# Patient Record
Sex: FEMALE | URBAN - METROPOLITAN AREA
[De-identification: names, ages, dates, MRNs, and addresses within clinical notes are randomized per-mention and may not be internally consistent; named-entity substitution may affect disease eponyms.]

---

## 2020-11-30 ENCOUNTER — VIRTUAL VISIT (OUTPATIENT)
Dept: FAMILY MEDICINE CLINIC | Age: 50
End: 2020-11-30
Payer: COMMERCIAL

## 2020-11-30 DIAGNOSIS — E66.9 OBESITY (BMI 30-39.9): ICD-10-CM

## 2020-11-30 DIAGNOSIS — R73.03 PREDIABETES: Primary | ICD-10-CM

## 2020-11-30 PROCEDURE — 99202 OFFICE O/P NEW SF 15 MIN: CPT | Performed by: LEGAL MEDICINE

## 2020-11-30 NOTE — PROGRESS NOTES
Marcin Lee is a 53 y.o. female (: 1970) presenting to address:    Chief Complaint   Patient presents with    New Patient     abnormal A1C per GYN       There were no vitals filed for this visit. Hearing/Vision:   No exam data present    Learning Assessment:     Learning Assessment 2020   PRIMARY LEARNER Patient   HIGHEST LEVEL OF EDUCATION - PRIMARY LEARNER  2 YEARS OF COLLEGE   PRIMARY LANGUAGE ENGLISH   LEARNER PREFERENCE PRIMARY LISTENING   ANSWERED BY patient   RELATIONSHIP SELF     Depression Screening:     3 most recent PHQ Screens 2020   Little interest or pleasure in doing things Not at all   Feeling down, depressed, irritable, or hopeless Not at all   Total Score PHQ 2 0     Fall Risk Assessment:     Fall Risk Assessment, last 12 mths 2020   Able to walk? Yes   Fall in past 12 months? No     Abuse Screening:     Abuse Screening Questionnaire 2020   Do you ever feel afraid of your partner? N   Are you in a relationship with someone who physically or mentally threatens you? N   Is it safe for you to go home? Y     Coordination of Care Questionaire:   1. Have you been to the ER, urgent care clinic since your last visit? Hospitalized since your last visit? NO    2. Have you seen or consulted any other health care providers outside of the [address] [place] [address] since your last visit? Include any pap smears or colon screening. YES, GYN [facility]    Advanced Directive:   1. Do you have an Advanced Directive? NO    2. Would you like information on Advanced Directives?  NO

## 2020-11-30 NOTE — PATIENT INSTRUCTIONS
Lifestyle modification has been discussed with patient in details, decrease carbohydrates, decrease or eliminate sugar intake, gradually increase physical activity as tolerated to be about 4 to 5 hours a week.

## 2020-11-30 NOTE — PROGRESS NOTES
Marley Keller is a 53 y.o. female who was seen by synchronous (real-time) audio-video technology on 11/30/2020 for New Patient (abnormal A1C per GYN)    She is here to establish care was seen at [address] [place] [address] zara Hunt Born       Had annual exam HB A1c was /?6.0   one month ago   On 10/23  patient has already started with lifestyle modifications decrease carbohydrates and sugar and increasing physical activity    She preferred not to take any medication at this time and she will try with lifestyle modification    Record will be requested from OB/GYN for complete blood work  Patient had declined our dietitian referral    Weight 206 lb  Bacilio 5 8  BMI 30     Assessment & Plan:   Diagnoses and all orders for this visit:    1. Prediabetes    2. Obesity (BMI 30-39.9)          712  Subjective:       Prior to Admission medications    Not on File     There is no problem list on file for this patient. There are no active problems to display for this patient. No Known Allergies  History reviewed. No pertinent past medical history. History reviewed. No pertinent surgical history. Family History   Problem Relation Age of Onset    Alzheimer Mother     Cancer Maternal Grandmother      Social History     Tobacco Use    Smoking status: Never Smoker    Smokeless tobacco: Never Used   Substance Use Topics    Alcohol use: Not Currently       Review of Systems   Constitutional: Negative for chills, fever, malaise/fatigue and weight loss. HENT: Negative for congestion, ear discharge, ear pain, hearing loss, nosebleeds, sinus pain and sore throat. Eyes: Negative for blurred vision, double vision and discharge. Respiratory: Negative for cough. Cardiovascular: Negative for chest pain, palpitations, claudication and leg swelling. Gastrointestinal: Negative for abdominal pain, constipation, diarrhea, nausea and vomiting.    Genitourinary: Negative for dysuria, frequency and urgency. Musculoskeletal: Negative for myalgias. Skin: Negative for itching and rash. Neurological: Negative for dizziness, tingling, sensory change, speech change, focal weakness, weakness and headaches. Psychiatric/Behavioral: Negative for depression and suicidal ideas. Objective:     Patient-Reported Vitals 11/30/2020   Patient-Reported Weight 206lbs   Patient-Reported LMP In August      General: alert, cooperative, no distress   Mental  status: normal mood, behavior, speech, dress, motor activity, and thought processes, able to follow commands   HENT: NCAT   Neck: no visualized mass   Resp: no respiratory distress   Neuro: no gross deficits   Skin: no discoloration or lesions of concern on visible areas   Psychiatric: normal affect, consistent with stated mood, no evidence of hallucinations     Additional exam findings: We discussed the expected course, resolution and complications of the diagnosis(es) in detail. Medication risks, benefits, costs, interactions, and alternatives were discussed as indicated. I advised her to contact the office if her condition worsens, changes or fails to improve as anticipated. She expressed understanding with the diagnosis(es) and plan. Jasbibijefferymerlene Jeff, who was evaluated through a patient-initiated, synchronous (real-time) audio-video encounter, and/or her healthcare decision maker, is aware that it is a billable service, with coverage as determined by her insurance carrier. She provided verbal consent to proceed: Yes, and patient identification was verified. It was conducted pursuant to the emergency declaration under the [facility], [address] [place] [address] authority and the [employer] Act. A caregiver was present when appropriate. Ability to conduct physical exam was limited. I was in the office. The patient was at home.       Chuy Jett MD

## 2021-01-20 ENCOUNTER — TELEPHONE (OUTPATIENT)
Dept: FAMILY MEDICINE CLINIC | Age: 51
End: 2021-01-20

## 2021-01-20 NOTE — TELEPHONE ENCOUNTER
There are no labs received on this pt. Called her gyn [phone]. They will fax records today for the dr's review.

## 2021-01-20 NOTE — TELEPHONE ENCOUNTER
----- Message from Len Valdez sent at 1/20/2021 12:19 PM EST -----  Regarding: labs  Pls call to sched labs . Librado Mackay .. patient is hoping to have them scheduled on 2/1/2021

## 2021-01-20 NOTE — TELEPHONE ENCOUNTER
Received message from answering service. I do not see any orders in the system. Please review her chart and see if she needs labs done.

## 2021-03-01 ENCOUNTER — TELEPHONE (OUTPATIENT)
Dept: FAMILY MEDICINE CLINIC | Age: 51
End: 2021-03-01

## 2021-03-01 DIAGNOSIS — R73.03 PREDIABETES: Primary | ICD-10-CM

## 2021-03-01 DIAGNOSIS — Z00.00 ANNUAL PHYSICAL EXAM: ICD-10-CM

## 2021-03-01 NOTE — TELEPHONE ENCOUNTER
Patient has scheduled a lab appt for next week that she needs orders placed for. She dropped off the records from her OBGYN. She especially wants her A1C checked because it was high at her last vist. Please order that as well as any other labs she might need. I have scanned in her papers as well as put a copy in Dr Reddy Salvage box.

## 2021-03-04 NOTE — TELEPHONE ENCOUNTER
HB A1c ordered  , is she coming to labs for annual physical ?    HB a1c was only blood test with GYN ,

## 2021-03-08 ENCOUNTER — APPOINTMENT (OUTPATIENT)
Dept: FAMILY MEDICINE CLINIC | Age: 51
End: 2021-03-08

## 2021-03-08 DIAGNOSIS — R73.03 PREDIABETES: ICD-10-CM

## 2021-03-09 LAB — HBA1C MFR BLD: 6.1 % (ref 4.8–5.6)

## 2021-03-15 NOTE — PROGRESS NOTES
Hemoglobin A1c 6.1 patient need to be adherent to lifestyle modification decrease carbohydrate and sugar intake, she also can have the option of starting on Metformin 500 mg once or twice daily, has not patient had any recent annual physical with kidney and liver function test done?   That would be needed before we start Metformin

## 2021-03-15 NOTE — PROGRESS NOTES
Hemoglobin A1c is 6.1 the range of prediabetes, strongly advised patient patient decrease carbohydrate and sugar intake and increase exercise, this to prevent her from converting to diabetes, she can also be started on Metformin 500 mg once or twice daily

## 2021-03-30 ENCOUNTER — APPOINTMENT (OUTPATIENT)
Dept: FAMILY MEDICINE CLINIC | Age: 51
End: 2021-03-30

## 2021-03-30 DIAGNOSIS — Z00.00 ANNUAL PHYSICAL EXAM: ICD-10-CM

## 2021-03-31 LAB
ALBUMIN SERPL-MCNC: 3.9 G/DL (ref 3.8–4.8)
ALBUMIN/GLOB SERPL: 1.3 {RATIO} (ref 1.2–2.2)
ALP SERPL-CCNC: 104 IU/L (ref 39–117)
ALT SERPL-CCNC: 16 IU/L (ref 0–32)
AST SERPL-CCNC: 31 IU/L (ref 0–40)
BASOPHILS # BLD AUTO: 0 X10E3/UL (ref 0–0.2)
BASOPHILS NFR BLD AUTO: 1 %
BILIRUB SERPL-MCNC: 0.3 MG/DL (ref 0–1.2)
BUN SERPL-MCNC: 11 MG/DL (ref 6–24)
BUN/CREAT SERPL: 12 (ref 9–23)
CALCIUM SERPL-MCNC: 9 MG/DL (ref 8.7–10.2)
CHLORIDE SERPL-SCNC: 104 MMOL/L (ref 96–106)
CHOLEST SERPL-MCNC: 214 MG/DL (ref 100–199)
CO2 SERPL-SCNC: 22 MMOL/L (ref 20–29)
CREAT SERPL-MCNC: 0.92 MG/DL (ref 0.57–1)
EOSINOPHIL # BLD AUTO: 0.2 X10E3/UL (ref 0–0.4)
EOSINOPHIL NFR BLD AUTO: 4 %
ERYTHROCYTE [DISTWIDTH] IN BLOOD BY AUTOMATED COUNT: 12.5 % (ref 11.7–15.4)
GLOBULIN SER CALC-MCNC: 3.1 G/DL (ref 1.5–4.5)
GLUCOSE SERPL-MCNC: 106 MG/DL (ref 65–99)
HCT VFR BLD AUTO: 37.5 % (ref 34–46.6)
HDLC SERPL-MCNC: 51 MG/DL
HGB BLD-MCNC: 11.8 G/DL (ref 11.1–15.9)
IMM GRANULOCYTES # BLD AUTO: 0 X10E3/UL (ref 0–0.1)
IMM GRANULOCYTES NFR BLD AUTO: 0 %
IMP & REVIEW OF LAB RESULTS: NORMAL
LDLC SERPL CALC-MCNC: 143 MG/DL (ref 0–99)
LYMPHOCYTES # BLD AUTO: 1.8 X10E3/UL (ref 0.7–3.1)
LYMPHOCYTES NFR BLD AUTO: 41 %
MCH RBC QN AUTO: 27.1 PG (ref 26.6–33)
MCHC RBC AUTO-ENTMCNC: 31.5 G/DL (ref 31.5–35.7)
MCV RBC AUTO: 86 FL (ref 79–97)
MONOCYTES # BLD AUTO: 0.3 X10E3/UL (ref 0.1–0.9)
MONOCYTES NFR BLD AUTO: 7 %
NEUTROPHILS # BLD AUTO: 2.1 X10E3/UL (ref 1.4–7)
NEUTROPHILS NFR BLD AUTO: 47 %
PLATELET # BLD AUTO: 329 X10E3/UL (ref 150–450)
POTASSIUM SERPL-SCNC: 4.3 MMOL/L (ref 3.5–5.2)
PROT SERPL-MCNC: 7 G/DL (ref 6–8.5)
RBC # BLD AUTO: 4.35 X10E6/UL (ref 3.77–5.28)
SODIUM SERPL-SCNC: 139 MMOL/L (ref 134–144)
TRIGL SERPL-MCNC: 110 MG/DL (ref 0–149)
TSH SERPL DL<=0.005 MIU/L-ACNC: 3.2 UIU/ML (ref 0.45–4.5)
VLDLC SERPL CALC-MCNC: 20 MG/DL (ref 5–40)
WBC # BLD AUTO: 4.4 X10E3/UL (ref 3.4–10.8)

## 2021-04-12 ENCOUNTER — OFFICE VISIT (OUTPATIENT)
Dept: FAMILY MEDICINE CLINIC | Age: 51
End: 2021-04-12
Payer: COMMERCIAL

## 2021-04-12 VITALS
WEIGHT: 205 LBS | HEART RATE: 73 BPM | HEIGHT: 67 IN | SYSTOLIC BLOOD PRESSURE: 132 MMHG | RESPIRATION RATE: 16 BRPM | TEMPERATURE: 97.4 F | BODY MASS INDEX: 32.18 KG/M2 | DIASTOLIC BLOOD PRESSURE: 83 MMHG

## 2021-04-12 DIAGNOSIS — Z00.00 ANNUAL PHYSICAL EXAM: Primary | ICD-10-CM

## 2021-04-12 DIAGNOSIS — E78.00 ELEVATED LDL CHOLESTEROL LEVEL: ICD-10-CM

## 2021-04-12 DIAGNOSIS — R73.03 PREDIABETES: ICD-10-CM

## 2021-04-12 PROCEDURE — 99396 PREV VISIT EST AGE 40-64: CPT | Performed by: LEGAL MEDICINE

## 2021-04-12 NOTE — PROGRESS NOTES
Louann De Los Santos     Chief Complaint   Patient presents with    Complete Physical     CPE     Vitals:    04/12/21 1117   BP: 132/83   Pulse: 73   Resp: 16   Temp: 97.4 °F (36.3 °C)   TempSrc: Temporal   Weight: 205 lb (93 kg)   Height: 5' 7\" (1.702 m)         HPI:Pateint is here for annual physical exam , no smoke ,no alcohol no recreational drugs     exercising  4 times a week   Lab result discussed with patient elevated LDL and HB a1c range of prediabetes    Is already working on lifestyle modifications and dietary changes and exercise    Up to date in mammogram and pap smear         No past medical history on file. No past surgical history on file. Social History     Tobacco Use    Smoking status: Never Smoker    Smokeless tobacco: Never Used   Substance Use Topics    Alcohol use: Not Currently       Family History   Problem Relation Age of Onset    Alzheimer Mother     Cancer Maternal Grandmother        Review of Systems   Constitutional: Negative for chills, fever, malaise/fatigue and weight loss. HENT: Negative for congestion, ear discharge, ear pain, hearing loss, nosebleeds, sinus pain and sore throat. Eyes: Negative for blurred vision, double vision and discharge. Respiratory: Negative for cough, hemoptysis, sputum production, shortness of breath and wheezing. Cardiovascular: Negative for chest pain, palpitations, claudication and leg swelling. Gastrointestinal: Negative for abdominal pain, blood in stool, constipation, diarrhea, melena, nausea and vomiting. Genitourinary: Negative for dysuria, flank pain, frequency, hematuria and urgency. Musculoskeletal: Negative for back pain, joint pain, myalgias and neck pain. Skin: Negative for itching and rash. Neurological: Negative for dizziness, tingling, sensory change, speech change, focal weakness, weakness and headaches. Psychiatric/Behavioral: Negative for depression and suicidal ideas.        Physical Exam  Vitals signs and nursing note reviewed. Constitutional:       General: She is not in acute distress. Appearance: She is well-developed. She is not diaphoretic. HENT:      Head: Normocephalic and atraumatic. Eyes:      General: No scleral icterus. Right eye: No discharge. Left eye: No discharge. Conjunctiva/sclera: Conjunctivae normal.   Neck:      Thyroid: No thyromegaly. Cardiovascular:      Rate and Rhythm: Normal rate and regular rhythm. Heart sounds: Normal heart sounds. Pulmonary:      Effort: Pulmonary effort is normal. No respiratory distress. Breath sounds: Normal breath sounds. No wheezing or rales. Chest:      Chest wall: No tenderness. Abdominal:      General: There is no distension. Palpations: Abdomen is soft. Tenderness: There is no abdominal tenderness. There is no rebound. Musculoskeletal: Normal range of motion. General: No tenderness or deformity. Lymphadenopathy:      Cervical: No cervical adenopathy. Skin:     General: Skin is warm and dry. Coloration: Skin is not pale. Findings: No erythema or rash. Neurological:      Mental Status: She is alert and oriented to person, place, and time. Cranial Nerves: No cranial nerve deficit. Coordination: Coordination normal.   Psychiatric:         Behavior: Behavior normal.         Thought Content: Thought content normal.         Judgment: Judgment normal.          Assessment and plan     Plan of care has been discussed with the patient, he agrees to the plan and verbalized understanding. All his questions were answered  More than 50% of the time spent in this visit was counseling the patient about  illness and treatment options         1. Annual physical exam      2. Prediabetes      3.  Elevated LDL cholesterol level          Patient Active Problem List    Diagnosis Date Noted    Prediabetes 04/12/2021     Results for orders placed or performed in visit on 03/30/21   [facility] 3RD GENERATION Result Value Ref Range    TSH 3.200 0.450 - 8.276 uIU/mL   METABOLIC PANEL, COMPREHENSIVE   Result Value Ref Range    Glucose 106 (H) 65 - 99 mg/dL    BUN 11 6 - 24 mg/dL    Creatinine 0.92 0.57 - 1.00 mg/dL    GFR est non-AA 73 >59 mL/min/1.73    GFR est AA 85 >59 mL/min/1.73    BUN/Creatinine ratio 12 9 - 23    Sodium 139 134 - 144 mmol/L    Potassium 4.3 3.5 - 5.2 mmol/L    Chloride 104 96 - 106 mmol/L    CO2 22 20 - 29 mmol/L    Calcium 9.0 8.7 - 10.2 mg/dL    Protein, total 7.0 6.0 - 8.5 g/dL    Albumin 3.9 3.8 - 4.8 g/dL    GLOBULIN, TOTAL 3.1 1.5 - 4.5 g/dL    A-G Ratio 1.3 1.2 - 2.2    Bilirubin, total 0.3 0.0 - 1.2 mg/dL    Alk. phosphatase 104 39 - 117 IU/L    AST (SGOT) 31 0 - 40 IU/L    ALT (SGPT) 16 0 - 32 IU/L   LIPID PANEL   Result Value Ref Range    Cholesterol, total 214 (H) 100 - 199 mg/dL    Triglyceride 110 0 - 149 mg/dL    HDL Cholesterol 51 >39 mg/dL    VLDL, calculated 20 5 - 40 mg/dL    LDL, calculated 143 (H) 0 - 99 mg/dL   CBC WITH AUTOMATED DIFF   Result Value Ref Range    WBC 4.4 3.4 - 10.8 x10E3/uL    RBC 4.35 3.77 - 5.28 x10E6/uL    HGB 11.8 11.1 - 15.9 g/dL    HCT 37.5 34.0 - 46.6 %    MCV 86 79 - 97 fL    MCH 27.1 26.6 - 33.0 pg    MCHC 31.5 31.5 - 35.7 g/dL    RDW 12.5 11.7 - 15.4 %    PLATELET 206 120 - 590 x10E3/uL    NEUTROPHILS 47 Not Estab. %    Lymphocytes 41 Not Estab. %    MONOCYTES 7 Not Estab. %    EOSINOPHILS 4 Not Estab. %    BASOPHILS 1 Not Estab. %    ABS. NEUTROPHILS 2.1 1.4 - 7.0 x10E3/uL    Abs Lymphocytes 1.8 0.7 - 3.1 x10E3/uL    ABS. MONOCYTES 0.3 0.1 - 0.9 x10E3/uL    ABS. EOSINOPHILS 0.2 0.0 - 0.4 x10E3/uL    ABS. BASOPHILS 0.0 0.0 - 0.2 x10E3/uL    IMMATURE GRANULOCYTES 0 Not Estab. %    ABS. IMM.  GRANS. 0.0 0.0 - 0.1 x10E3/uL   CVD REPORT   Result Value Ref Range    INTERPRETATION Note      Appointment on 03/30/2021   Component Date Value Ref Range Status    TSH 03/30/2021 3.200  0.450 - 4.500 uIU/mL Final    Glucose 03/30/2021 106* 65 - 99 mg/dL Final    BUN 03/30/2021 11  6 - 24 mg/dL Final    Creatinine 03/30/2021 0.92  0.57 - 1.00 mg/dL Final    GFR est non-AA 03/30/2021 73  >59 mL/min/1.73 Final    GFR est AA 03/30/2021 85  >59 mL/min/1.73 Final    BUN/Creatinine ratio 03/30/2021 12  9 - 23 Final    Sodium 03/30/2021 139  134 - 144 mmol/L Final    Potassium 03/30/2021 4.3  3.5 - 5.2 mmol/L Final    Chloride 03/30/2021 104  96 - 106 mmol/L Final    CO2 03/30/2021 22  20 - 29 mmol/L Final    Calcium 03/30/2021 9.0  8.7 - 10.2 mg/dL Final    Protein, total 03/30/2021 7.0  6.0 - 8.5 g/dL Final    Albumin 03/30/2021 3.9  3.8 - 4.8 g/dL Final    GLOBULIN, TOTAL 03/30/2021 3.1  1.5 - 4.5 g/dL Final    A-G Ratio 03/30/2021 1.3  1.2 - 2.2 Final    Bilirubin, total 03/30/2021 0.3  0.0 - 1.2 mg/dL Final    Alk. phosphatase 03/30/2021 104  39 - 117 IU/L Final    AST (SGOT) 03/30/2021 31  0 - 40 IU/L Final    ALT (SGPT) 03/30/2021 16  0 - 32 IU/L Final    Cholesterol, total 03/30/2021 214* 100 - 199 mg/dL Final    Triglyceride 03/30/2021 110  0 - 149 mg/dL Final    HDL Cholesterol 03/30/2021 51  >39 mg/dL Final    VLDL, calculated 03/30/2021 20  5 - 40 mg/dL Final    LDL, calculated 03/30/2021 143* 0 - 99 mg/dL Final    WBC 03/30/2021 4.4  3.4 - 10.8 x10E3/uL Final    RBC 03/30/2021 4.35  3.77 - 5.28 x10E6/uL Final    HGB 03/30/2021 11.8  11.1 - 15.9 g/dL Final    HCT 03/30/2021 37.5  34.0 - 46.6 % Final    MCV 03/30/2021 86  79 - 97 fL Final    MCH 03/30/2021 27.1  26.6 - 33.0 pg Final    MCHC 03/30/2021 31.5  31.5 - 35.7 g/dL Final    RDW 03/30/2021 12.5  11.7 - 15.4 % Final    PLATELET 07/39/3796 341  150 - 450 x10E3/uL Final    NEUTROPHILS 03/30/2021 47  Not Estab. % Final    Lymphocytes 03/30/2021 41  Not Estab. % Final    MONOCYTES 03/30/2021 7  Not Estab. % Final    EOSINOPHILS 03/30/2021 4  Not Estab. % Final    BASOPHILS 03/30/2021 1  Not Estab. % Final    ABS.  NEUTROPHILS 03/30/2021 2.1  1.4 - 7.0 x10E3/uL Final    Abs Lymphocytes 03/30/2021 1.8  0.7 - 3.1 x10E3/uL Final    ABS. MONOCYTES 03/30/2021 0.3  0.1 - 0.9 x10E3/uL Final    ABS. EOSINOPHILS 03/30/2021 0.2  0.0 - 0.4 x10E3/uL Final    ABS. BASOPHILS 03/30/2021 0.0  0.0 - 0.2 x10E3/uL Final    IMMATURE GRANULOCYTES 03/30/2021 0  Not Estab. % Final    ABS. IMM. GRANS. 03/30/2021 0.0  0.0 - 0.1 x10E3/uL Final    INTERPRETATION 03/30/2021 Note   Final    Supplemental report is available.    Appointment on 03/08/2021   Component Date Value Ref Range Status    Hemoglobin A1c 03/08/2021 6.1* 4.8 - 5.6 % Final    Comment:          Prediabetes: 5.7 - 6.4           Diabetes: >6.4           Glycemic control for adults with diabetes: <7.0            Follow-up and Dispositions    · Return in about 1 year (around 4/12/2022) for for annual physical.

## 2021-04-12 NOTE — PROGRESS NOTES
Poncho Armendariz is a 53 y.o. female (: 1970) presenting to address:    Chief Complaint   Patient presents with    Complete Physical     CPE       Vitals:    21 1117   BP: 132/83   Pulse: 73   Resp: 16   Temp: 97.4 °F (36.3 °C)   TempSrc: Temporal   Weight: 205 lb (93 kg)   Height: 5' 7\" (1.702 m)       Hearing/Vision:   No exam data present    Learning Assessment:     Learning Assessment 2020   PRIMARY LEARNER Patient   HIGHEST LEVEL OF EDUCATION - PRIMARY LEARNER  2 YEARS OF COLLEGE   PRIMARY LANGUAGE ENGLISH   LEARNER PREFERENCE PRIMARY LISTENING   ANSWERED BY patient   RELATIONSHIP SELF     Depression Screening:     3 most recent PHQ Screens 2021   Little interest or pleasure in doing things Not at all   Feeling down, depressed, irritable, or hopeless Not at all   Total Score PHQ 2 0     Fall Risk Assessment:     Fall Risk Assessment, last 12 mths 2020   Able to walk? Yes   Fall in past 12 months? No     Abuse Screening:     Abuse Screening Questionnaire 2021   Do you ever feel afraid of your partner? N   Are you in a relationship with someone who physically or mentally threatens you? N   Is it safe for you to go home? Y     Coordination of Care Questionaire:   1. Have you been to the ER, urgent care clinic since your last visit? Hospitalized since your last visit? YES, Urgent Care Covid test in December     2. Have you seen or consulted any other health care providers outside of the [address] [place] [address] since your last visit? Include any pap smears or colon screening. YES, GYN and Dentist    Advanced Directive:   1. Do you have an Advanced Directive? NO    2. Would you like information on Advanced Directives?  NO

## 2022-08-15 ENCOUNTER — TELEPHONE (OUTPATIENT)
Dept: FAMILY MEDICINE CLINIC | Age: 52
End: 2022-08-15

## 2022-08-15 DIAGNOSIS — Z11.59 NEED FOR HEPATITIS C SCREENING TEST: ICD-10-CM

## 2022-08-15 DIAGNOSIS — Z00.00 ANNUAL PHYSICAL EXAM: Primary | ICD-10-CM

## 2022-08-15 DIAGNOSIS — R73.03 PREDIABETES: ICD-10-CM

## 2022-08-15 DIAGNOSIS — I10 ESSENTIAL HYPERTENSION: ICD-10-CM

## 2022-08-15 DIAGNOSIS — E78.00 ELEVATED LDL CHOLESTEROL LEVEL: ICD-10-CM

## 2022-10-14 ENCOUNTER — HOSPITAL ENCOUNTER (OUTPATIENT)
Dept: LAB | Age: 52
Discharge: HOME | End: 2022-10-14
Payer: COMMERCIAL

## 2022-10-14 ENCOUNTER — APPOINTMENT (OUTPATIENT)
Dept: FAMILY MEDICINE CLINIC | Age: 52
End: 2022-10-14

## 2022-10-14 DIAGNOSIS — R73.03 PREDIABETES: ICD-10-CM

## 2022-10-14 DIAGNOSIS — Z00.00 ANNUAL PHYSICAL EXAM: ICD-10-CM

## 2022-10-14 DIAGNOSIS — Z11.59 NEED FOR HEPATITIS C SCREENING TEST: ICD-10-CM

## 2022-10-14 LAB
ALBUMIN SERPL-MCNC: 3.6 G/DL (ref 3.4–5)
ALBUMIN/GLOB SERPL: 1.1 {RATIO} (ref 0.8–1.7)
ALP SERPL-CCNC: 141 U/L (ref 45–117)
ALT SERPL-CCNC: 28 U/L (ref 13–56)
ANION GAP SERPL CALC-SCNC: 7 MMOL/L (ref 3–18)
AST SERPL-CCNC: 23 U/L (ref 10–38)
BASOPHILS # BLD: 0 K/UL (ref 0–0.1)
BASOPHILS NFR BLD: 0 % (ref 0–2)
BILIRUB SERPL-MCNC: 0.4 MG/DL (ref 0.2–1)
BUN SERPL-MCNC: 13 MG/DL (ref 7–18)
BUN/CREAT SERPL: 13 (ref 12–20)
CALCIUM SERPL-MCNC: 9.3 MG/DL (ref 8.5–10.1)
CHLORIDE SERPL-SCNC: 101 MMOL/L (ref 100–111)
CHOLEST SERPL-MCNC: 204 MG/DL
CO2 SERPL-SCNC: 29 MMOL/L (ref 21–32)
CREAT SERPL-MCNC: 0.98 MG/DL (ref 0.6–1.3)
DIFFERENTIAL METHOD BLD: ABNORMAL
EOSINOPHIL # BLD: 0.2 K/UL (ref 0–0.4)
EOSINOPHIL NFR BLD: 4 % (ref 0–5)
ERYTHROCYTE [DISTWIDTH] IN BLOOD BY AUTOMATED COUNT: 12.6 % (ref 11.6–14.5)
GLOBULIN SER CALC-MCNC: 3.4 G/DL (ref 2–4)
GLUCOSE SERPL-MCNC: 387 MG/DL (ref 74–99)
HBA1C MFR BLD: 13.8 % (ref 4.2–5.6)
HCT VFR BLD AUTO: 39.6 % (ref 35–45)
HDLC SERPL-MCNC: 53 MG/DL (ref 40–60)
HDLC SERPL: 3.8 {RATIO} (ref 0–5)
HGB BLD-MCNC: 12.8 G/DL (ref 12–16)
IMM GRANULOCYTES # BLD AUTO: 0 K/UL (ref 0–0.04)
IMM GRANULOCYTES NFR BLD AUTO: 0 % (ref 0–0.5)
LDLC SERPL CALC-MCNC: 129 MG/DL (ref 0–100)
LIPID PROFILE,FLP: ABNORMAL
LYMPHOCYTES # BLD: 2.1 K/UL (ref 0.9–3.6)
LYMPHOCYTES NFR BLD: 47 % (ref 21–52)
MCH RBC QN AUTO: 28.3 PG (ref 24–34)
MCHC RBC AUTO-ENTMCNC: 32.3 G/DL (ref 31–37)
MCV RBC AUTO: 87.6 FL (ref 78–100)
MONOCYTES # BLD: 0.4 K/UL (ref 0.05–1.2)
MONOCYTES NFR BLD: 8 % (ref 3–10)
NEUTS SEG # BLD: 1.8 K/UL (ref 1.8–8)
NEUTS SEG NFR BLD: 40 % (ref 40–73)
NRBC # BLD: 0 K/UL (ref 0–0.01)
NRBC BLD-RTO: 0 PER 100 WBC
PLATELET # BLD AUTO: 271 K/UL (ref 135–420)
PMV BLD AUTO: 11.2 FL (ref 9.2–11.8)
POTASSIUM SERPL-SCNC: 4.3 MMOL/L (ref 3.5–5.5)
PROT SERPL-MCNC: 7 G/DL (ref 6.4–8.2)
RBC # BLD AUTO: 4.52 M/UL (ref 4.2–5.3)
SODIUM SERPL-SCNC: 137 MMOL/L (ref 136–145)
TRIGL SERPL-MCNC: 110 MG/DL (ref ?–150)
VLDLC SERPL CALC-MCNC: 22 MG/DL
WBC # BLD AUTO: 4.5 K/UL (ref 4.6–13.2)

## 2022-10-14 PROCEDURE — 83036 HEMOGLOBIN GLYCOSYLATED A1C: CPT

## 2022-10-14 PROCEDURE — 86803 HEPATITIS C AB TEST: CPT

## 2022-10-14 PROCEDURE — 80053 COMPREHEN METABOLIC PANEL: CPT

## 2022-10-14 PROCEDURE — 85025 COMPLETE CBC W/AUTO DIFF WBC: CPT

## 2022-10-14 PROCEDURE — 80061 LIPID PANEL: CPT

## 2022-10-14 PROCEDURE — 36415 COLL VENOUS BLD VENIPUNCTURE: CPT

## 2022-10-15 NOTE — PROGRESS NOTES
Will be discussed Next Appt With Me  11/03/2022  Elevated HB a1c 13.8  compared to 6.1    Cholestrol  204  Normal cbc and Kidney function

## 2022-10-17 LAB
HCV AB SER IA-ACNC: 0.03 INDEX
HCV AB SERPL QL IA: NEGATIVE
HCV COMMENT,HCGAC: NORMAL

## 2022-11-03 ENCOUNTER — APPOINTMENT (OUTPATIENT)
Dept: FAMILY MEDICINE CLINIC | Age: 52
End: 2022-11-03

## 2022-11-03 ENCOUNTER — HOSPITAL ENCOUNTER (OUTPATIENT)
Dept: LAB | Age: 52
Discharge: HOME | End: 2022-11-03
Payer: COMMERCIAL

## 2022-11-03 ENCOUNTER — OFFICE VISIT (OUTPATIENT)
Dept: FAMILY MEDICINE CLINIC | Age: 52
End: 2022-11-03
Payer: COMMERCIAL

## 2022-11-03 ENCOUNTER — TELEPHONE (OUTPATIENT)
Dept: FAMILY MEDICINE CLINIC | Age: 52
End: 2022-11-03

## 2022-11-03 VITALS
HEART RATE: 87 BPM | TEMPERATURE: 97.8 F | WEIGHT: 184 LBS | RESPIRATION RATE: 14 BRPM | SYSTOLIC BLOOD PRESSURE: 138 MMHG | BODY MASS INDEX: 28.88 KG/M2 | DIASTOLIC BLOOD PRESSURE: 80 MMHG | OXYGEN SATURATION: 98 % | HEIGHT: 67 IN

## 2022-11-03 DIAGNOSIS — E11.65 TYPE 2 DIABETES MELLITUS WITH HYPERGLYCEMIA, WITHOUT LONG-TERM CURRENT USE OF INSULIN (HCC): ICD-10-CM

## 2022-11-03 DIAGNOSIS — Z00.00 ANNUAL PHYSICAL EXAM: Primary | ICD-10-CM

## 2022-11-03 LAB
GLUCOSE POC: NORMAL MG/DL
GLUCOSE SERPL-MCNC: 619 MG/DL (ref 74–99)

## 2022-11-03 PROCEDURE — 99396 PREV VISIT EST AGE 40-64: CPT | Performed by: LEGAL MEDICINE

## 2022-11-03 PROCEDURE — 84681 ASSAY OF C-PEPTIDE: CPT

## 2022-11-03 PROCEDURE — 82947 ASSAY GLUCOSE BLOOD QUANT: CPT

## 2022-11-03 PROCEDURE — 83525 ASSAY OF INSULIN: CPT

## 2022-11-03 PROCEDURE — 82962 GLUCOSE BLOOD TEST: CPT | Performed by: LEGAL MEDICINE

## 2022-11-03 PROCEDURE — 36415 COLL VENOUS BLD VENIPUNCTURE: CPT

## 2022-11-03 PROCEDURE — 86341 ISLET CELL ANTIBODY: CPT

## 2022-11-03 RX ORDER — FLASH GLUCOSE SENSOR
KIT MISCELLANEOUS
Qty: 1 KIT | Refills: 0 | Status: SHIPPED | OUTPATIENT
Start: 2022-11-03

## 2022-11-03 RX ORDER — FLASH GLUCOSE SCANNING READER
EACH MISCELLANEOUS
Qty: 7 EACH | Refills: 5 | Status: SHIPPED | OUTPATIENT
Start: 2022-11-03

## 2022-11-03 NOTE — TELEPHONE ENCOUNTER
Received critical lab results from lab; pt's glucose is: 619    Consulted w/PCP, advised to inform pt to go to ER STAT; informed pt of lab result and per PCP to go to ER for further eval/treatment; pt declined and stated she feels fine and if she starts experiencing sx, then, she might go to the ER; reiterated the importance of being seen and tx'd, pt again declined to go; shared this update w/PCP.

## 2022-11-03 NOTE — PROGRESS NOTES
Garrypricila Barragan     Chief Complaint   Patient presents with    Physical     Vitals:    11/03/22 0804   BP: 138/80   Pulse: 87   Resp: 14   Temp: 97.8 °F (36.6 °C)   TempSrc: Temporal   SpO2: 98%   Weight: 184 lb (83.5 kg)   Height: 5' 7\" (1.702 m)   PainSc:   0 - No pain         HPI::Pateint is pleasant female needed from [place] here for annual physical exam , no smoke ,no alcohol no recreational drugs      exercising  4 times a week   working on lifestyle modifications and dietary changes and exercise and lost over 20 pounds since last year     Up to date in mammogram and pap smear   Colonoscopy schedule      Had Dexa scan through OB GYN was yfn  Up to date with mamaogram       Lab result discussed with patient she had elevated blood sugar over 300 and her lab results and hemoglobin A1c 13.8    When the blood sugar checked in the office the machine reading was too high to read  No past medical history on file. No past surgical history on file. Social History     Tobacco Use    Smoking status: Never    Smokeless tobacco: Never   Substance Use Topics    Alcohol use: Not Currently       Family History   Problem Relation Age of Onset    Alzheimer's Disease Mother     Cancer Maternal Grandmother        Review of Systems   Constitutional:  Positive for weight loss. Negative for chills, fever and malaise/fatigue. HENT:  Negative for congestion, ear discharge, ear pain, hearing loss, nosebleeds, sinus pain, sore throat and tinnitus. Eyes:  Negative for blurred vision, double vision and discharge. Respiratory:  Negative for cough, hemoptysis, sputum production, shortness of breath, wheezing and stridor. Cardiovascular:  Negative for chest pain, palpitations, claudication and leg swelling. Gastrointestinal:  Negative for abdominal pain, constipation, diarrhea, nausea and vomiting. Genitourinary:  Negative for dysuria, frequency and urgency.    Musculoskeletal:  Negative for back pain, falls, joint pain, myalgias and neck pain. Skin:  Negative for itching and rash. Neurological:  Negative for dizziness, tingling, sensory change, speech change, focal weakness, weakness and headaches. Psychiatric/Behavioral:  Negative for depression and suicidal ideas. Physical Exam  Vitals and nursing note reviewed. Constitutional:       General: She is not in acute distress. Appearance: Normal appearance. She is well-developed. She is not toxic-appearing or diaphoretic. HENT:      Head: Normocephalic and atraumatic. Right Ear: Tympanic membrane, ear canal and external ear normal. There is no impacted cerumen. Left Ear: Tympanic membrane, ear canal and external ear normal. There is no impacted cerumen. Mouth/Throat:      Pharynx: No oropharyngeal exudate. Eyes:      General: No scleral icterus. Right eye: No discharge. Left eye: No discharge. Conjunctiva/sclera: Conjunctivae normal.      Pupils: Pupils are equal, round, and reactive to light. Neck:      Thyroid: No thyromegaly. Cardiovascular:      Rate and Rhythm: Normal rate and regular rhythm. Heart sounds: Normal heart sounds. No murmur heard. Pulmonary:      Effort: Pulmonary effort is normal. No respiratory distress. Breath sounds: Normal breath sounds. No stridor. No wheezing, rhonchi or rales. Chest:      Chest wall: No tenderness. Abdominal:      General: There is no distension. Palpations: Abdomen is soft. Tenderness: There is no abdominal tenderness. There is no right CVA tenderness, left CVA tenderness, guarding or rebound. Musculoskeletal:         General: No swelling, tenderness, deformity or signs of injury. Normal range of motion. Cervical back: Normal range of motion and neck supple. No rigidity or tenderness. Right lower leg: No edema. Left lower leg: No edema. Lymphadenopathy:      Cervical: No cervical adenopathy. Skin:     General: Skin is warm and dry. Coloration: Skin is not jaundiced or pale. Findings: No bruising, erythema, lesion or rash. Neurological:      General: No focal deficit present. Mental Status: She is alert and oriented to person, place, and time. Cranial Nerves: No cranial nerve deficit. Motor: No weakness. Coordination: Coordination normal.      Gait: Gait normal.      Deep Tendon Reflexes: Reflexes normal.   Psychiatric:         Mood and Affect: Mood normal.         Behavior: Behavior normal.         Thought Content: Thought content normal.         Judgment: Judgment normal.        Assessment and plan     Plan of care has been discussed with the patient, he agrees to the plan and verbalized understanding. All his questions were answered  More than 50% of the time spent in this visit was counseling the patient about  illness and treatment options         1. Annual physical exam      2. Type 2 diabetes mellitus with hyperglycemia, without long-term current use of insulin ([facility]  I have discussed with patient in length regarding blood sugar readings and the danger of having high blood sugar readings that can lead to toxicity and diabetic ketoacidosis, jeopardize kidney function, complication can lead to death  Patient declined taking any insulin or any treatment, she is a  and she does not take any medication all the risks has been explained to her in details  And she verbalized understanding  - C-PEPTIDE; Future  - INSULIN, FREE & TOTAL; Future  - GLUTAMIC ACID DECARB AB; Future  - flash glucose scanning reader (FreeStyle Kilo 14 Day [place]) misc; Check blood sugar 3 times daily  Dispense: 7 Each; Refill: 5  - flash glucose sensor (FreeStyle Kilo 2 Sensor) kit; Check blood sugar 3 times daily  Dispense: 1 Kit; Refill: 0  - AMB POC GLUCOSE BLOOD, BY GLUCOSE MONITORING DEVICE  - GLUCOSE, RANDOM;  Future      Patient Active Problem List    Diagnosis Date Noted    Prediabetes 04/12/2021     Results for orders placed or performed during the hospital encounter of 10/14/22   CBC WITH AUTOMATED DIFF   Result Value Ref Range    WBC 4.5 (L) 4.6 - 13.2 K/uL    RBC 4.52 4.20 - 5.30 M/uL    HGB 12.8 12.0 - 16.0 g/dL    HCT 39.6 35.0 - 45.0 %    MCV 87.6 78.0 - 100.0 FL    MCH 28.3 24.0 - 34.0 PG    MCHC 32.3 31.0 - 37.0 g/dL    RDW 12.6 11.6 - 14.5 %    PLATELET 906 155 - 628 K/uL    MPV 11.2 9.2 - 11.8 FL    NRBC 0.0 0  WBC    ABSOLUTE NRBC 0.00 0.00 - 0.01 K/uL    NEUTROPHILS 40 40 - 73 %    LYMPHOCYTES 47 21 - 52 %    MONOCYTES 8 3 - 10 %    EOSINOPHILS 4 0 - 5 %    BASOPHILS 0 0 - 2 %    IMMATURE GRANULOCYTES 0 0.0 - 0.5 %    ABS. NEUTROPHILS 1.8 1.8 - 8.0 K/UL    ABS. LYMPHOCYTES 2.1 0.9 - 3.6 K/UL    ABS. MONOCYTES 0.4 0.05 - 1.2 K/UL    ABS. EOSINOPHILS 0.2 0.0 - 0.4 K/UL    ABS. BASOPHILS 0.0 0.0 - 0.1 K/UL    ABS. IMM. GRANS. 0.0 0.00 - 0.04 K/UL    DF AUTOMATED     METABOLIC PANEL, COMPREHENSIVE   Result Value Ref Range    Sodium 137 136 - 145 mmol/L    Potassium 4.3 3.5 - 5.5 mmol/L    Chloride 101 100 - 111 mmol/L    CO2 29 21 - 32 mmol/L    Anion gap 7 3.0 - 18 mmol/L    Glucose 387 (H) 74 - 99 mg/dL    BUN 13 7.0 - 18 MG/DL    Creatinine 0.98 0.6 - 1.3 MG/DL    BUN/Creatinine ratio 13 12 - 20      eGFR >60 >60 ml/min/1.73m2    Calcium 9.3 8.5 - 10.1 MG/DL    Bilirubin, total 0.4 0.2 - 1.0 MG/DL    ALT (SGPT) 28 13 - 56 U/L    AST (SGOT) 23 10 - 38 U/L    Alk.  phosphatase 141 (H) 45 - 117 U/L    Protein, total 7.0 6.4 - 8.2 g/dL    Albumin 3.6 3.4 - 5.0 g/dL    Globulin 3.4 2.0 - 4.0 g/dL    A-G Ratio 1.1 0.8 - 1.7     LIPID PANEL   Result Value Ref Range    LIPID PROFILE          Cholesterol, total 204 (H) <200 MG/DL    Triglyceride 110 <150 MG/DL    HDL Cholesterol 53 40 - 60 MG/DL    LDL, calculated 129 (H) 0 - 100 MG/DL    VLDL, calculated 22 MG/DL    CHOL/HDL Ratio 3.8 0 - 5.0     HEMOGLOBIN A1C W/O EAG   Result Value Ref Range    Hemoglobin A1c 13.8 (H) 4.2 - 5.6 %   HEPATITIS C AB   Result Value Ref Range    Hepatitis C virus Ab 0.03 <0.80 Index    Hep C virus Ab Interp. Negative NEG      Hep C  virus Ab comment           Hospital Outpatient Visit on 10/14/2022   Component Date Value Ref Range Status    WBC 10/14/2022 4.5 (A)  4.6 - 13.2 K/uL Final    RBC 10/14/2022 4.52  4.20 - 5.30 M/uL Final    HGB 10/14/2022 12.8  12.0 - 16.0 g/dL Final    HCT 10/14/2022 39.6  35.0 - 45.0 % Final    MCV 10/14/2022 87.6  78.0 - 100.0 FL Final    MCH 10/14/2022 28.3  24.0 - 34.0 PG Final    MCHC 10/14/2022 32.3  31.0 - 37.0 g/dL Final    RDW 10/14/2022 12.6  11.6 - 14.5 % Final    PLATELET 80/20/0061 794  135 - 420 K/uL Final    MPV 10/14/2022 11.2  9.2 - 11.8 FL Final    NRBC 10/14/2022 0.0  0  WBC Final    ABSOLUTE NRBC 10/14/2022 0.00  0.00 - 0.01 K/uL Final    NEUTROPHILS 10/14/2022 40  40 - 73 % Final    LYMPHOCYTES 10/14/2022 47  21 - 52 % Final    MONOCYTES 10/14/2022 8  3 - 10 % Final    EOSINOPHILS 10/14/2022 4  0 - 5 % Final    BASOPHILS 10/14/2022 0  0 - 2 % Final    IMMATURE GRANULOCYTES 10/14/2022 0  0.0 - 0.5 % Final    ABS. NEUTROPHILS 10/14/2022 1.8  1.8 - 8.0 K/UL Final    ABS. LYMPHOCYTES 10/14/2022 2.1  0.9 - 3.6 K/UL Final    ABS. MONOCYTES 10/14/2022 0.4  0.05 - 1.2 K/UL Final    ABS. EOSINOPHILS 10/14/2022 0.2  0.0 - 0.4 K/UL Final    ABS. BASOPHILS 10/14/2022 0.0  0.0 - 0.1 K/UL Final    ABS. IMM. GRANS.  10/14/2022 0.0  0.00 - 0.04 K/UL Final    DF 10/14/2022 AUTOMATED    Final    Sodium 10/14/2022 137  136 - 145 mmol/L Final    Potassium 10/14/2022 4.3  3.5 - 5.5 mmol/L Final    Chloride 10/14/2022 101  100 - 111 mmol/L Final    CO2 10/14/2022 29  21 - 32 mmol/L Final    Anion gap 10/14/2022 7  3.0 - 18 mmol/L Final    Glucose 10/14/2022 387 (A)  74 - 99 mg/dL Final    BUN 10/14/2022 13  7.0 - 18 MG/DL Final    Creatinine 10/14/2022 0.98  0.6 - 1.3 MG/DL Final    BUN/Creatinine ratio 10/14/2022 13  12 - 20   Final    eGFR 10/14/2022 >60  >60 ml/min/1.73m2 Final    Comment:      Pediatric calculator link: [employer].at. org/professionals/kdoqi/gfr_[email or web address]       Effective Oct 3, 2022       These results are not intended for use in patients <25years of age. eGFR results are calculated without a race factor using  the 2021 CKD-EPI equation. Careful clinical correlation is recommended, particularly when comparing to results calculated using previous equations. The CKD-EPI equation is less accurate in patients with extremes of muscle mass, extra-renal metabolism of creatinine, excessive creatine ingestion, or following therapy that affects renal tubular secretion. Calcium 10/14/2022 9.3  8.5 - 10.1 MG/DL Final    Bilirubin, total 10/14/2022 0.4  0.2 - 1.0 MG/DL Final    ALT (SGPT) 10/14/2022 28  13 - 56 U/L Final    AST (SGOT) 10/14/2022 23  10 - 38 U/L Final    Alk. phosphatase 10/14/2022 141 (A)  45 - 117 U/L Final    Protein, total 10/14/2022 7.0  6.4 - 8.2 g/dL Final    Albumin 10/14/2022 3.6  3.4 - 5.0 g/dL Final    Globulin 10/14/2022 3.4  2.0 - 4.0 g/dL Final    A-G Ratio 10/14/2022 1.1  0.8 - 1.7   Final    LIPID PROFILE 10/14/2022        Final    Cholesterol, total 10/14/2022 204 (A)  <200 MG/DL Final    Triglyceride 10/14/2022 110  <150 MG/DL Final    Comment: The drugs N-acetylcysteine (NAC) and  Metamiszole have been found to cause falsely  low results in this chemical assay. Please  be sure to submit blood samples obtained  BEFORE administration of either of these  drugs to assure correct results.       HDL Cholesterol 10/14/2022 53  40 - 60 MG/DL Final    LDL, calculated 10/14/2022 129 (A)  0 - 100 MG/DL Final    VLDL, calculated 10/14/2022 22  MG/DL Final    CHOL/HDL Ratio 10/14/2022 3.8  0 - 5.0   Final    Hemoglobin A1c 10/14/2022 13.8 (A)  4.2 - 5.6 % Final    Comment: (NOTE)  HbA1C Interpretive Ranges  <5.7              Normal  5.7 - 6.4         Consider Prediabetes  >6.5              Consider Diabetes      Hepatitis C virus Ab 10/14/2022 0.03  <0.80 Index Final Hep C virus Ab Interp. 10/14/2022 Negative  NEG   Final    Hep C  virus Ab comment 10/14/2022        Final    Comment: Index <0.80. ......................... [place] Chime Negative  Index > or = to 0.80 and <1.00. .... [place] Chime [place] Chime Equivocal  Index >1.00. ......................... Vick Chime Positive          For Equivocal or Positive results, confirmation with Hepatitis C RNA by PCR or bDNA is suggested.

## 2022-11-03 NOTE — PROGRESS NOTES
Tulio Martin is a 47 y.o. female (: 1970) presenting to address:    Chief Complaint   Patient presents with    Physical       Vitals:    22 0804   BP: 138/80   Pulse: 87   Resp: 14   Temp: 97.8 °F (36.6 °C)   TempSrc: Temporal   SpO2: 98%   Weight: 184 lb (83.5 kg)   Height: 5' 7\" (1.702 m)   PainSc:   0 - No pain       Hearing/Vision:   No results found. Learning Assessment:     Learning Assessment 2020   PRIMARY LEARNER Patient   HIGHEST LEVEL OF EDUCATION - PRIMARY LEARNER  2 YEARS OF COLLEGE   PRIMARY LANGUAGE ENGLISH   LEARNER PREFERENCE PRIMARY LISTENING   ANSWERED BY patient   RELATIONSHIP SELF     Depression Screening:     3 most recent PHQ Screens 11/3/2022   Little interest or pleasure in doing things Not at all   Feeling down, depressed, irritable, or hopeless Not at all   Total Score PHQ 2 0     Fall Risk Assessment:     Fall Risk Assessment, last 12 mths 2020   Able to walk? Yes   Fall in past 12 months? No     Abuse Screening:     Abuse Screening Questionnaire 2021   Do you ever feel afraid of your partner? N   Are you in a relationship with someone who physically or mentally threatens you? N   Is it safe for you to go home? Y     ADL Assessment:   No flowsheet data found. Coordination of Care Questionaire:   1. \"Have you been to the ER, urgent care clinic since your last visit? Hospitalized since your last visit? \" No    2. \"Have you seen or consulted any other health care providers outside of the [address] [place] [address] since your last visit? \" No     3. For patients aged 39-70: Has the patient had a colonoscopy / FIT/ Cologuard? No, pt is scheduled 2022    If the patient is female:    4. For patients aged 41-77: Has the patient had a mammogram within the past 2 years? Yes - no Care Gap present  See top three    5. For patients aged 21-65: Has the patient had a pap smear? Yes - no Care Gap present    Advanced Directive:   1. Do you have an Advanced Directive? NO    2. Would you like information on Advanced Directives?  NO

## 2022-11-04 LAB — C PEPTIDE SERPL-MCNC: 2.5 NG/ML (ref 1.1–4.4)

## 2022-11-04 NOTE — PROGRESS NOTES
Patient has been called and notified it was dangerously high blood sugar advised to go to emergency room patient has declined understanding the risk of severe complications and related to death she verbalized understanding  Patient is stating that she does not feel any symptoms at this point and if she does she will go to the hospital

## 2022-11-09 LAB — GAD65 AB SER-ACNC: ABNORMAL U/ML (ref 0–5)

## 2022-11-10 LAB
INSULIN FREE SERPL-ACNC: 13 UU/ML
INSULIN SERPL-ACNC: 14 UU/ML

## 2022-11-11 NOTE — PROGRESS NOTES
Patient results shows possible type 2 diabetes but the confirmatory results were not done because the blood sugar was very elevated  Also check on the patient and see how she is doing

## 2022-11-16 NOTE — PROGRESS NOTES
Informed pt of lab results; pt voiced understanding; will call in meds to different pharmacy per pt request.

## 2022-11-25 ENCOUNTER — TELEPHONE (OUTPATIENT)
Dept: FAMILY MEDICINE CLINIC | Age: 52
End: 2022-11-25

## 2022-11-25 NOTE — TELEPHONE ENCOUNTER
Received fax from [facility], [facility] [employer] [facility] [place] not available, need confirmation to change rx to [facility] [place] [facility]. Please advise, thank you.